# Patient Record
Sex: FEMALE | Race: WHITE | NOT HISPANIC OR LATINO | Employment: UNEMPLOYED | URBAN - METROPOLITAN AREA
[De-identification: names, ages, dates, MRNs, and addresses within clinical notes are randomized per-mention and may not be internally consistent; named-entity substitution may affect disease eponyms.]

---

## 2023-03-29 DIAGNOSIS — Z12.11 COLON CANCER SCREENING: ICD-10-CM

## 2023-05-03 DIAGNOSIS — E55.9 VITAMIN D DEFICIENCY: ICD-10-CM

## 2023-05-03 DIAGNOSIS — E78.5 DYSLIPIDEMIA: Primary | ICD-10-CM

## 2023-05-19 ENCOUNTER — LAB (OUTPATIENT)
Dept: LAB | Facility: LAB | Age: 78
End: 2023-05-19
Payer: MEDICARE

## 2023-05-19 DIAGNOSIS — E55.9 VITAMIN D DEFICIENCY: ICD-10-CM

## 2023-05-19 DIAGNOSIS — E78.5 DYSLIPIDEMIA: ICD-10-CM

## 2023-05-19 LAB
BASOPHILS (10*3/UL) IN BLOOD BY AUTOMATED COUNT: 0.03 X10E9/L (ref 0–0.1)
BASOPHILS/100 LEUKOCYTES IN BLOOD BY AUTOMATED COUNT: 0.6 % (ref 0–2)
CHOLESTEROL (MG/DL) IN SER/PLAS: 255 MG/DL (ref 0–199)
CHOLESTEROL IN HDL (MG/DL) IN SER/PLAS: 44.1 MG/DL
CHOLESTEROL/HDL RATIO: 5.8
EOSINOPHILS (10*3/UL) IN BLOOD BY AUTOMATED COUNT: 0.15 X10E9/L (ref 0–0.4)
EOSINOPHILS/100 LEUKOCYTES IN BLOOD BY AUTOMATED COUNT: 2.8 % (ref 0–6)
ERYTHROCYTE DISTRIBUTION WIDTH (RATIO) BY AUTOMATED COUNT: 13.5 % (ref 11.5–14.5)
ERYTHROCYTE MEAN CORPUSCULAR HEMOGLOBIN CONCENTRATION (G/DL) BY AUTOMATED: 31.4 G/DL (ref 32–36)
ERYTHROCYTE MEAN CORPUSCULAR VOLUME (FL) BY AUTOMATED COUNT: 96 FL (ref 80–100)
ERYTHROCYTES (10*6/UL) IN BLOOD BY AUTOMATED COUNT: 4.73 X10E12/L (ref 4–5.2)
HEMATOCRIT (%) IN BLOOD BY AUTOMATED COUNT: 45.5 % (ref 36–46)
HEMOGLOBIN (G/DL) IN BLOOD: 14.3 G/DL (ref 12–16)
IMMATURE GRANULOCYTES/100 LEUKOCYTES IN BLOOD BY AUTOMATED COUNT: 0.2 % (ref 0–0.9)
LDL: 169 MG/DL (ref 0–99)
LEUKOCYTES (10*3/UL) IN BLOOD BY AUTOMATED COUNT: 5.4 X10E9/L (ref 4.4–11.3)
LYMPHOCYTES (10*3/UL) IN BLOOD BY AUTOMATED COUNT: 1.46 X10E9/L (ref 0.8–3)
LYMPHOCYTES/100 LEUKOCYTES IN BLOOD BY AUTOMATED COUNT: 27.2 % (ref 13–44)
MONOCYTES (10*3/UL) IN BLOOD BY AUTOMATED COUNT: 0.41 X10E9/L (ref 0.05–0.8)
MONOCYTES/100 LEUKOCYTES IN BLOOD BY AUTOMATED COUNT: 7.6 % (ref 2–10)
NEUTROPHILS (10*3/UL) IN BLOOD BY AUTOMATED COUNT: 3.31 X10E9/L (ref 1.6–5.5)
NEUTROPHILS/100 LEUKOCYTES IN BLOOD BY AUTOMATED COUNT: 61.6 % (ref 40–80)
NON HDL CHOLESTEROL: 211 MG/DL
PLATELETS (10*3/UL) IN BLOOD AUTOMATED COUNT: 274 X10E9/L (ref 150–450)
THYROTROPIN (MIU/L) IN SER/PLAS BY DETECTION LIMIT <= 0.05 MIU/L: 2.37 MIU/L (ref 0.44–3.98)
TRIGLYCERIDE (MG/DL) IN SER/PLAS: 210 MG/DL (ref 0–149)
VLDL: 42 MG/DL (ref 0–40)

## 2023-05-19 PROCEDURE — 80061 LIPID PANEL: CPT

## 2023-05-19 PROCEDURE — 85025 COMPLETE CBC W/AUTO DIFF WBC: CPT

## 2023-05-19 PROCEDURE — 84443 ASSAY THYROID STIM HORMONE: CPT

## 2023-05-19 PROCEDURE — 82306 VITAMIN D 25 HYDROXY: CPT

## 2023-05-19 PROCEDURE — 36415 COLL VENOUS BLD VENIPUNCTURE: CPT

## 2023-05-20 LAB — CALCIDIOL (25 OH VITAMIN D3) (NG/ML) IN SER/PLAS: 27 NG/ML

## 2023-05-24 ENCOUNTER — OFFICE VISIT (OUTPATIENT)
Dept: PRIMARY CARE | Facility: CLINIC | Age: 78
End: 2023-05-24
Payer: MEDICARE

## 2023-05-24 VITALS
DIASTOLIC BLOOD PRESSURE: 76 MMHG | HEIGHT: 60 IN | HEART RATE: 89 BPM | WEIGHT: 138 LBS | SYSTOLIC BLOOD PRESSURE: 130 MMHG | BODY MASS INDEX: 27.09 KG/M2

## 2023-05-24 DIAGNOSIS — Z12.31 VISIT FOR SCREENING MAMMOGRAM: Primary | ICD-10-CM

## 2023-05-24 DIAGNOSIS — L30.9 ECZEMA, UNSPECIFIED TYPE: ICD-10-CM

## 2023-05-24 DIAGNOSIS — Z00.00 ROUTINE GENERAL MEDICAL EXAMINATION AT HEALTH CARE FACILITY: ICD-10-CM

## 2023-05-24 DIAGNOSIS — C22.1 INTRAHEPATIC BILE DUCT CARCINOMA (MULTI): ICD-10-CM

## 2023-05-24 DIAGNOSIS — E78.5 DYSLIPIDEMIA, GOAL LDL BELOW 130: ICD-10-CM

## 2023-05-24 PROBLEM — J32.9 SINUSITIS: Status: ACTIVE | Noted: 2023-05-24

## 2023-05-24 PROBLEM — R26.2 DIFFICULTY IN WALKING, NOT ELSEWHERE CLASSIFIED: Status: ACTIVE | Noted: 2019-03-09

## 2023-05-24 PROBLEM — U07.1 COVID-19 VIRUS INFECTION: Status: ACTIVE | Noted: 2023-05-24

## 2023-05-24 PROBLEM — R63.4 UNEXPLAINED WEIGHT LOSS: Status: ACTIVE | Noted: 2023-05-24

## 2023-05-24 PROBLEM — E53.8 VITAMIN B 12 DEFICIENCY: Status: ACTIVE | Noted: 2023-05-24

## 2023-05-24 PROBLEM — C23 GALLBLADDER CANCER (MULTI): Status: ACTIVE | Noted: 2023-05-24

## 2023-05-24 PROBLEM — M25.579 TOE JOINT PAIN: Status: ACTIVE | Noted: 2023-05-24

## 2023-05-24 PROBLEM — K63.5 COLON POLYP: Status: ACTIVE | Noted: 2023-05-24

## 2023-05-24 PROBLEM — R18.8 INTRAABDOMINAL FLUID COLLECTION: Status: ACTIVE | Noted: 2023-05-24

## 2023-05-24 PROBLEM — Z90.89 ACQUIRED ABSENCE OF OTHER ORGANS: Status: ACTIVE | Noted: 2019-03-09

## 2023-05-24 PROBLEM — K82.9 GALLBLADDER DISEASE: Status: ACTIVE | Noted: 2023-05-24

## 2023-05-24 PROBLEM — J96.00 ACUTE RESPIRATORY FAILURE (MULTI): Status: ACTIVE | Noted: 2019-03-09

## 2023-05-24 PROBLEM — K43.2 INCISIONAL HERNIA: Status: ACTIVE | Noted: 2023-05-24

## 2023-05-24 PROBLEM — M19.90 UNSPECIFIED OSTEOARTHRITIS, UNSPECIFIED SITE: Status: ACTIVE | Noted: 2019-03-09

## 2023-05-24 PROBLEM — M19.079 PRIMARY LOCALIZED OSTEOARTHROSIS, ANKLE AND FOOT: Status: ACTIVE | Noted: 2023-05-24

## 2023-05-24 PROBLEM — M62.81 MUSCLE WEAKNESS (GENERALIZED): Status: ACTIVE | Noted: 2019-03-09

## 2023-05-24 PROBLEM — M25.559 ACUTE HIP PAIN: Status: ACTIVE | Noted: 2023-05-24

## 2023-05-24 PROBLEM — Z90.49 ACQUIRED ABSENCE OF OTHER SPECIFIED PARTS OF DIGESTIVE TRACT: Status: ACTIVE | Noted: 2019-03-13

## 2023-05-24 PROBLEM — C23: Status: ACTIVE | Noted: 2019-03-09

## 2023-05-24 PROBLEM — J96.00 ACUTE RESPIRATORY FAILURE (MULTI): Status: RESOLVED | Noted: 2019-03-09 | Resolved: 2023-05-24

## 2023-05-24 PROBLEM — T85.9XXA DISORDER OF BILE DUCT STENT: Status: ACTIVE | Noted: 2023-05-24

## 2023-05-24 PROBLEM — E56.9 VITAMIN DEFICIENCY, UNSPECIFIED: Status: ACTIVE | Noted: 2019-03-09

## 2023-05-24 PROBLEM — M54.16 LUMBAR RADICULOPATHY: Status: ACTIVE | Noted: 2023-05-24

## 2023-05-24 PROBLEM — K21.9 GASTRO-ESOPHAGEAL REFLUX DISEASE WITHOUT ESOPHAGITIS: Status: ACTIVE | Noted: 2019-03-09

## 2023-05-24 PROBLEM — D50.9 IRON DEFICIENCY ANEMIA: Status: ACTIVE | Noted: 2023-05-24

## 2023-05-24 PROBLEM — E04.2 MULTIPLE THYROID NODULES: Status: ACTIVE | Noted: 2023-05-24

## 2023-05-24 PROBLEM — C80.1 ADENOCARCINOMA (MULTI): Status: ACTIVE | Noted: 2023-05-24

## 2023-05-24 PROBLEM — R19.00 PELVIC MASS: Status: ACTIVE | Noted: 2023-05-24

## 2023-05-24 PROBLEM — M72.2 PLANTAR FASCIITIS: Status: ACTIVE | Noted: 2023-05-24

## 2023-05-24 PROBLEM — K76.89 OTHER SPECIFIED DISEASES OF LIVER: Status: ACTIVE | Noted: 2019-03-09

## 2023-05-24 PROBLEM — E55.9 VITAMIN D DEFICIENCY: Status: ACTIVE | Noted: 2019-03-09

## 2023-05-24 PROBLEM — D69.6 THROMBOCYTOPENIA, UNSPECIFIED (CMS-HCC): Status: RESOLVED | Noted: 2019-03-09 | Resolved: 2023-05-24

## 2023-05-24 PROBLEM — K90.9 INTESTINAL MALABSORPTION (HHS-HCC): Status: ACTIVE | Noted: 2023-05-24

## 2023-05-24 PROBLEM — R10.13 EPIGASTRIC PAIN: Status: ACTIVE | Noted: 2023-05-24

## 2023-05-24 PROBLEM — D69.6 THROMBOCYTOPENIA, UNSPECIFIED (CMS-HCC): Status: ACTIVE | Noted: 2019-03-09

## 2023-05-24 PROCEDURE — G0439 PPPS, SUBSEQ VISIT: HCPCS | Performed by: INTERNAL MEDICINE

## 2023-05-24 PROCEDURE — 99213 OFFICE O/P EST LOW 20 MIN: CPT | Performed by: INTERNAL MEDICINE

## 2023-05-24 PROCEDURE — 1159F MED LIST DOCD IN RCRD: CPT | Performed by: INTERNAL MEDICINE

## 2023-05-24 PROCEDURE — 1160F RVW MEDS BY RX/DR IN RCRD: CPT | Performed by: INTERNAL MEDICINE

## 2023-05-24 PROCEDURE — 1170F FXNL STATUS ASSESSED: CPT | Performed by: INTERNAL MEDICINE

## 2023-05-24 PROCEDURE — 1036F TOBACCO NON-USER: CPT | Performed by: INTERNAL MEDICINE

## 2023-05-24 RX ORDER — TRIAMCINOLONE ACETONIDE 1 MG/G
CREAM TOPICAL 2 TIMES DAILY
Qty: 30 G | Refills: 0 | Status: SHIPPED | OUTPATIENT
Start: 2023-05-24 | End: 2023-11-20

## 2023-05-24 RX ORDER — ACETAMINOPHEN 500 MG
TABLET ORAL
COMMUNITY

## 2023-05-24 ASSESSMENT — ACTIVITIES OF DAILY LIVING (ADL)
TAKING_MEDICATION: INDEPENDENT
MANAGING_FINANCES: INDEPENDENT
BATHING: INDEPENDENT
DRESSING: INDEPENDENT
DOING_HOUSEWORK: INDEPENDENT
GROCERY_SHOPPING: INDEPENDENT

## 2023-05-24 NOTE — PROGRESS NOTES
Subjective   Patient ID: Taylor Venegas is a 78 y.o. female who presents for Medicare Annual Wellness Visit Subsequent.    HPI     Review of Systems    Objective   BP (!) 157/94   Pulse 89   Ht 1.524 m (5')   Wt 62.6 kg (138 lb)   BMI 26.95 kg/m²     Physical Exam    Assessment/Plan

## 2023-05-24 NOTE — PROGRESS NOTES
Subjective   Reason for Visit: Taylor Venegas is an 78 y.o. female here for a Medicare Wellness visit.          Reviewed all medications by prescribing practitioner or clinical pharmacist (such as prescriptions, OTCs, herbal therapies and supplements) and documented in the medical record.    She has been doing okay, hanging in there  Still working.  Goes to the [employer]    She walks regularly usually,   No cp or pressure  No sob  Bowels are regular, gets loose occasionally  Gets alittle reflux, no food sticking  No urinary issues    Mood is fine          Patient Care Team:  Cassy Gonzalez DO as PCP - General  Cassy Gonzalez DO as PCP - [employer] PCP     Review of Systems    Objective   Vitals:  BP (!) 157/94   Pulse 89   Ht 1.524 m (5')   Wt 62.6 kg (138 lb)   BMI 26.95 kg/m²       Physical Exam  Constitutional:       Appearance: Normal appearance.   Neck:      Vascular: No carotid bruit.   Cardiovascular:      Rate and Rhythm: Normal rate and regular rhythm.      Heart sounds: No murmur heard.  Pulmonary:      Effort: Pulmonary effort is normal.      Breath sounds: Normal breath sounds. No wheezing or rhonchi.   Abdominal:      General: Abdomen is flat.      Palpations: Abdomen is soft. There is no mass.      Tenderness: There is no abdominal tenderness.   Musculoskeletal:      Right lower leg: No edema.      Left lower leg: No edema.   Lymphadenopathy:      Cervical: No cervical adenopathy.   Skin:     General: Skin is dry.      Coloration: Skin is not jaundiced.      Findings: Rash (left shin with scaly reddish brown rash, not warm, no drainage) present.   Neurological:      Mental Status: She is alert and oriented to person, place, and time.   Psychiatric:         Mood and Affect: Mood normal.         Assessment/Plan   Problem List Items Addressed This Visit    None

## 2023-05-24 NOTE — PATIENT INSTRUCTIONS
Bp is good    Cholesterol is bad, for low hdl get regular exercise (doesn't tolerate fish oil)  For bad cholesterol, add oatmeal or daily metamucil  Recheck ct cardiac score in 2026, if any plaque will add statin    Call [phone] to schedule mammogram  Colonoscopy is scheduled  Bone density due in 2031  Get Prevnar 20 at the pharmacy, second pneumonia shot    Increase vitamin d to 4000units daily    Leg rash is eczema, use triamcinolone cream 1-2 times a day (has had in the past/eczema, no likely from the dog scratch)    Recheck in 1 year

## 2023-07-03 ENCOUNTER — HOSPITAL ENCOUNTER (OUTPATIENT)
Dept: DATA CONVERSION | Facility: HOSPITAL | Age: 78
End: 2023-07-03
Attending: INTERNAL MEDICINE
Payer: MEDICARE

## 2023-07-03 DIAGNOSIS — Z92.21 PERSONAL HISTORY OF ANTINEOPLASTIC CHEMOTHERAPY: ICD-10-CM

## 2023-07-03 DIAGNOSIS — Z12.11 ENCOUNTER FOR SCREENING FOR MALIGNANT NEOPLASM OF COLON: ICD-10-CM

## 2023-07-03 DIAGNOSIS — Z90.49 ACQUIRED ABSENCE OF OTHER SPECIFIED PARTS OF DIGESTIVE TRACT: ICD-10-CM

## 2023-07-03 DIAGNOSIS — K64.4 RESIDUAL HEMORRHOIDAL SKIN TAGS: ICD-10-CM

## 2023-07-03 DIAGNOSIS — Z86.010 PERSONAL HISTORY OF COLONIC POLYPS: ICD-10-CM

## 2023-07-03 DIAGNOSIS — K63.5 POLYP OF COLON: ICD-10-CM

## 2023-07-03 DIAGNOSIS — Z85.09 PERSONAL HISTORY OF MALIGNANT NEOPLASM OF OTHER DIGESTIVE ORGANS: ICD-10-CM

## 2023-07-03 DIAGNOSIS — K64.8 OTHER HEMORRHOIDS: ICD-10-CM

## 2023-07-03 DIAGNOSIS — D12.0 BENIGN NEOPLASM OF CECUM: ICD-10-CM

## 2023-07-10 LAB
COMPLETE PATHOLOGY REPORT: NORMAL
CONVERTED CLINICAL DIAGNOSIS-HISTORY: NORMAL
CONVERTED FINAL DIAGNOSIS: NORMAL
CONVERTED FINAL REPORT PDF LINK TO COPY AND PASTE: NORMAL
CONVERTED GROSS DESCRIPTION: NORMAL

## 2023-08-14 ENCOUNTER — OFFICE VISIT (OUTPATIENT)
Dept: PRIMARY CARE | Facility: CLINIC | Age: 78
End: 2023-08-14
Payer: MEDICARE

## 2023-08-14 VITALS
DIASTOLIC BLOOD PRESSURE: 86 MMHG | WEIGHT: 138 LBS | BODY MASS INDEX: 27.09 KG/M2 | SYSTOLIC BLOOD PRESSURE: 165 MMHG | HEIGHT: 60 IN | HEART RATE: 86 BPM

## 2023-08-14 DIAGNOSIS — J30.9 ALLERGIC RHINITIS, UNSPECIFIED SEASONALITY, UNSPECIFIED TRIGGER: Primary | ICD-10-CM

## 2023-08-14 DIAGNOSIS — S16.1XXA NECK STRAIN, INITIAL ENCOUNTER: ICD-10-CM

## 2023-08-14 DIAGNOSIS — H81.12 BENIGN PAROXYSMAL POSITIONAL VERTIGO OF LEFT EAR: ICD-10-CM

## 2023-08-14 PROCEDURE — 1160F RVW MEDS BY RX/DR IN RCRD: CPT | Performed by: INTERNAL MEDICINE

## 2023-08-14 PROCEDURE — 1036F TOBACCO NON-USER: CPT | Performed by: INTERNAL MEDICINE

## 2023-08-14 PROCEDURE — 1159F MED LIST DOCD IN RCRD: CPT | Performed by: INTERNAL MEDICINE

## 2023-08-14 PROCEDURE — 99213 OFFICE O/P EST LOW 20 MIN: CPT | Performed by: INTERNAL MEDICINE

## 2023-08-14 PROCEDURE — 1126F AMNT PAIN NOTED NONE PRSNT: CPT | Performed by: INTERNAL MEDICINE

## 2023-08-14 NOTE — PROGRESS NOTES
Subjective   Patient ID: Taylor Venegas is a 78 y.o. female who presents for Sinusitis.    HPI     Review of Systems    Objective   There were no vitals taken for this visit.    Physical Exam    Assessment/Plan

## 2023-08-14 NOTE — PROGRESS NOTES
Subjective   Patient ID: Taylor Venegas is a 78 y.o. female who presents for Sinusitis.    For the last few weeks her neck is bothering her and it cracks when she moves it    When she lays back at night she gets dizzy    She has had a lot of drainage and the other night her right ear popped    No fevers  No cough  Feels nasally  Clear nasal drainage        Sinusitis         Review of Systems    Objective   /86   Pulse 86   Ht 1.524 m (5')   Wt 62.6 kg (138 lb)   BMI 26.95 kg/m²     Physical Exam  Constitutional:       Appearance: Normal appearance.   HENT:      Ears:      Comments: Left tm bulges slightly  No redness or fluid b/l     Nose: Congestion present.      Comments: Turbs are pale and boggy, no drainage, septum deviated to the left  Neck:      Comments: Neck paraspinal muscles ropy and tender  Cardiovascular:      Rate and Rhythm: Normal rate and regular rhythm.      Heart sounds: No murmur heard.  Neurological:      Mental Status: She is alert.      Comments: No pronator drift  Finger to nose intact  Myers pike positive to the left with nystagmus         Assessment/Plan          Patient Instructions   Get the PREVNAR 20 at the pharmacy, any time    Dizziness is from positional vertigo of the left ear, do positional maneuver /Epley once and slowly. If still dizzy can repeat 1 week later, if still not better to vestibular therapy    Nasal congestion is allergies, no infection. Either use OTC flonase or nasonex to decongest OR can take zyrtec/cetirizine 10mg daily

## 2023-08-14 NOTE — PATIENT INSTRUCTIONS
Get the PREVNAR 20 at the pharmacy, any time    Dizziness is from positional vertigo of the left ear, do positional maneuver /Epley once and slowly. If still dizzy can repeat 1 week later, if still not better to vestibular therapy    Nasal congestion is allergies, no infection. Either use OTC flonase or nasonex to decongest OR can take zyrtec/cetirizine 10mg daily

## 2023-09-15 ENCOUNTER — HOSPITAL ENCOUNTER (OUTPATIENT)
Dept: DATA CONVERSION | Facility: HOSPITAL | Age: 78
End: 2023-09-15
Attending: RADIOLOGY
Payer: MEDICARE

## 2023-09-15 DIAGNOSIS — Z78.9 OTHER SPECIFIED HEALTH STATUS: ICD-10-CM

## 2023-09-15 DIAGNOSIS — C23 MALIGNANT NEOPLASM OF GALLBLADDER (MULTI): ICD-10-CM

## 2023-11-20 ENCOUNTER — OFFICE VISIT (OUTPATIENT)
Dept: PRIMARY CARE | Facility: CLINIC | Age: 78
End: 2023-11-20
Payer: MEDICARE

## 2023-11-20 VITALS
SYSTOLIC BLOOD PRESSURE: 153 MMHG | WEIGHT: 138 LBS | HEART RATE: 88 BPM | TEMPERATURE: 98.4 F | BODY MASS INDEX: 27.09 KG/M2 | HEIGHT: 60 IN | DIASTOLIC BLOOD PRESSURE: 86 MMHG | OXYGEN SATURATION: 98 %

## 2023-11-20 DIAGNOSIS — J06.9 UPPER RESPIRATORY TRACT INFECTION, UNSPECIFIED TYPE: Primary | ICD-10-CM

## 2023-11-20 DIAGNOSIS — J01.00 ACUTE NON-RECURRENT MAXILLARY SINUSITIS: ICD-10-CM

## 2023-11-20 DIAGNOSIS — H10.32 ACUTE CONJUNCTIVITIS OF LEFT EYE, UNSPECIFIED ACUTE CONJUNCTIVITIS TYPE: ICD-10-CM

## 2023-11-20 PROCEDURE — 1160F RVW MEDS BY RX/DR IN RCRD: CPT | Performed by: INTERNAL MEDICINE

## 2023-11-20 PROCEDURE — 1126F AMNT PAIN NOTED NONE PRSNT: CPT | Performed by: INTERNAL MEDICINE

## 2023-11-20 PROCEDURE — 99214 OFFICE O/P EST MOD 30 MIN: CPT | Performed by: INTERNAL MEDICINE

## 2023-11-20 PROCEDURE — 87637 SARSCOV2&INF A&B&RSV AMP PRB: CPT

## 2023-11-20 PROCEDURE — 1159F MED LIST DOCD IN RCRD: CPT | Performed by: INTERNAL MEDICINE

## 2023-11-20 PROCEDURE — 1036F TOBACCO NON-USER: CPT | Performed by: INTERNAL MEDICINE

## 2023-11-20 RX ORDER — CEFUROXIME AXETIL 250 MG/1
250 TABLET ORAL 2 TIMES DAILY
Qty: 20 TABLET | Refills: 0 | Status: SHIPPED | OUTPATIENT
Start: 2023-11-20 | End: 2023-11-30

## 2023-11-20 RX ORDER — TOBRAMYCIN AND DEXAMETHASONE 3; 1 MG/ML; MG/ML
1 SUSPENSION/ DROPS OPHTHALMIC
Qty: 5 ML | Refills: 0 | Status: SHIPPED | OUTPATIENT
Start: 2023-11-20 | End: 2023-11-30

## 2023-11-20 ASSESSMENT — ENCOUNTER SYMPTOMS: COUGH: 1

## 2023-11-20 NOTE — PATIENT INSTRUCTIONS
Upper respiratory infection with left sinusitis and pink eye  Checking for flu/covid/rsv  Take cefuroxime 250mg twice daily (covers sinus infection and lung infections)  Call if diarrhea or rash  For pink eye, use tobradex 1 drop every 4 hours while awake for 5 days, both eyes are red, use in both eyes     Call if not better

## 2023-11-20 NOTE — PROGRESS NOTES
Subjective   Patient ID: Taylor Venegas is a 78 y.o. female who presents for Cough and Sinusitis.    Started to get sick,   Sinus congestion and cough, now more in her chest and her coughing.   No fever, not sob  Cough is productive but doesn't look at it  She took mucinex, but gave her diarrhea.   Taking advil cold and sinus  Nasal drainage is clear  Left eye is irritated and draining clear fluid  No body aches.     Cough    Sinusitis  Associated symptoms include coughing.        Review of Systems   Respiratory:  Positive for cough.        Objective   /86   Pulse 87   Ht 1.524 m (5')   Wt 62.6 kg (138 lb)   BMI 26.95 kg/m²     Physical Exam  Constitutional:       Appearance: Normal appearance.   HENT:      Ears:      Comments: Left tm bulge slightly     Nose: Congestion present.      Comments: Yellow drainage on the left     Mouth/Throat:      Pharynx: Posterior oropharyngeal erythema present. No oropharyngeal exudate.   Eyes:      Comments: Hyperemia of b/l conjunctiva, worse on the left  No purulence but runny large amount of clear drainage   Cardiovascular:      Rate and Rhythm: Normal rate and regular rhythm.      Heart sounds: No murmur heard.  Pulmonary:      Effort: Pulmonary effort is normal.      Breath sounds: Normal breath sounds.   Neurological:      Mental Status: She is alert and oriented to person, place, and time.         Assessment/Plan          Patient Instructions   Upper respiratory infection with left sinusitis and pink eye  Checking for flu/covid/rsv  Take cefuroxime 250mg twice daily (covers sinus infection and lung infections)  Call if diarrhea or rash  For pink eye, use tobradex 1 drop every 4 hours while awake for 5 days, both eyes are red, use in both eyes     Call if not better

## 2023-11-20 NOTE — PROGRESS NOTES
Subjective   Patient ID: Taylor Venegas is a 78 y.o. female who presents for Cough and Sinusitis.    HPI     Review of Systems    Objective   There were no vitals taken for this visit.    Physical Exam    Assessment/Plan

## 2023-11-21 LAB
FLUAV RNA RESP QL NAA+PROBE: NOT DETECTED
FLUBV RNA RESP QL NAA+PROBE: NOT DETECTED
RSV RNA RESP QL NAA+PROBE: NOT DETECTED
SARS-COV-2 RNA RESP QL NAA+PROBE: NOT DETECTED

## 2024-08-22 ENCOUNTER — TELEPHONE (OUTPATIENT)
Dept: HEMATOLOGY/ONCOLOGY | Facility: CLINIC | Age: 79
End: 2024-08-22
Payer: MEDICARE

## 2024-08-23 DIAGNOSIS — Z08 ENCOUNTER FOR FOLLOW-UP SURVEILLANCE OF GALLBLADDER CANCER: ICD-10-CM

## 2024-08-23 DIAGNOSIS — Z85.09 ENCOUNTER FOR FOLLOW-UP SURVEILLANCE OF GALLBLADDER CANCER: ICD-10-CM

## 2024-08-23 DIAGNOSIS — C23 MALIGNANT NEOPLASM OF GALLBLADDER (MULTI): Primary | ICD-10-CM

## 2024-08-27 ENCOUNTER — TELEPHONE (OUTPATIENT)
Dept: HEMATOLOGY/ONCOLOGY | Facility: CLINIC | Age: 79
End: 2024-08-27
Payer: MEDICARE

## 2024-08-27 DIAGNOSIS — C80.1 ADENOCARCINOMA (MULTI): Primary | ICD-10-CM

## 2024-09-06 ENCOUNTER — TELEPHONE (OUTPATIENT)
Dept: HEMATOLOGY/ONCOLOGY | Facility: CLINIC | Age: 79
End: 2024-09-06
Payer: MEDICARE

## 2024-09-06 NOTE — TELEPHONE ENCOUNTER
Noted pt had an appt for her midline, but no longer had a CT scan ordered.  Note said pt cancelled appt.  Called pt and she states she was told she had to cancel because she did not have a midline appt made.  Appt rescheduled for CT so pt is appropriately scheduled for midline prior to CT scan.  Called scanning department and they said they will probably take pt right after midline is placed instead of making her wait until 1200.  Returned call to pt and explained she does have both appts scheduled, however, she may need to wait a bit, though they usually try to get people done soon.  Pt taught back understanding of appts and potential to wait for CT scan. Pt taught back appt details.  Told to call for any needs or to call scanning if she has any concerns with those.  Pt verbalized understanding and appreciation.

## 2024-09-16 ENCOUNTER — LAB (OUTPATIENT)
Dept: LAB | Facility: LAB | Age: 79
End: 2024-09-16
Payer: MEDICARE

## 2024-09-16 DIAGNOSIS — C23 MALIGNANT NEOPLASM OF GALLBLADDER (MULTI): ICD-10-CM

## 2024-09-16 DIAGNOSIS — Z08 ENCOUNTER FOR FOLLOW-UP SURVEILLANCE OF GALLBLADDER CANCER: ICD-10-CM

## 2024-09-16 DIAGNOSIS — Z85.09 ENCOUNTER FOR FOLLOW-UP SURVEILLANCE OF GALLBLADDER CANCER: ICD-10-CM

## 2024-09-16 LAB
ALBUMIN SERPL BCP-MCNC: 4 G/DL (ref 3.4–5)
ALP SERPL-CCNC: 73 U/L (ref 33–136)
ALT SERPL W P-5'-P-CCNC: 15 U/L (ref 7–45)
ANION GAP SERPL CALC-SCNC: 12 MMOL/L (ref 10–20)
AST SERPL W P-5'-P-CCNC: 20 U/L (ref 9–39)
BASOPHILS # BLD AUTO: 0.06 X10*3/UL (ref 0–0.1)
BASOPHILS NFR BLD AUTO: 1.3 %
BILIRUB SERPL-MCNC: 0.5 MG/DL (ref 0–1.2)
BUN SERPL-MCNC: 20 MG/DL (ref 6–23)
CALCIUM SERPL-MCNC: 8.8 MG/DL (ref 8.6–10.3)
CANCER AG19-9 SERPL-ACNC: 25.67 U/ML
CEA SERPL-MCNC: 0.7 UG/L
CHLORIDE SERPL-SCNC: 105 MMOL/L (ref 98–107)
CO2 SERPL-SCNC: 27 MMOL/L (ref 21–32)
CREAT SERPL-MCNC: 0.77 MG/DL (ref 0.5–1.05)
EGFRCR SERPLBLD CKD-EPI 2021: 79 ML/MIN/1.73M*2
EOSINOPHIL # BLD AUTO: 0.18 X10*3/UL (ref 0–0.4)
EOSINOPHIL NFR BLD AUTO: 4 %
ERYTHROCYTE [DISTWIDTH] IN BLOOD BY AUTOMATED COUNT: 13.4 % (ref 11.5–14.5)
GLUCOSE SERPL-MCNC: 71 MG/DL (ref 74–99)
HCT VFR BLD AUTO: 45.5 % (ref 36–46)
HGB BLD-MCNC: 14.3 G/DL (ref 12–16)
IMM GRANULOCYTES # BLD AUTO: 0.01 X10*3/UL (ref 0–0.5)
IMM GRANULOCYTES NFR BLD AUTO: 0.2 % (ref 0–0.9)
LYMPHOCYTES # BLD AUTO: 1.48 X10*3/UL (ref 0.8–3)
LYMPHOCYTES NFR BLD AUTO: 33 %
MCH RBC QN AUTO: 29.8 PG (ref 26–34)
MCHC RBC AUTO-ENTMCNC: 31.4 G/DL (ref 32–36)
MCV RBC AUTO: 95 FL (ref 80–100)
MONOCYTES # BLD AUTO: 0.31 X10*3/UL (ref 0.05–0.8)
MONOCYTES NFR BLD AUTO: 6.9 %
NEUTROPHILS # BLD AUTO: 2.44 X10*3/UL (ref 1.6–5.5)
NEUTROPHILS NFR BLD AUTO: 54.6 %
NRBC BLD-RTO: 0 /100 WBCS (ref 0–0)
PLATELET # BLD AUTO: 278 X10*3/UL (ref 150–450)
POTASSIUM SERPL-SCNC: 4.1 MMOL/L (ref 3.5–5.3)
PROT SERPL-MCNC: 6.8 G/DL (ref 6.4–8.2)
RBC # BLD AUTO: 4.8 X10*6/UL (ref 4–5.2)
SODIUM SERPL-SCNC: 140 MMOL/L (ref 136–145)
WBC # BLD AUTO: 4.5 X10*3/UL (ref 4.4–11.3)

## 2024-09-16 PROCEDURE — 80053 COMPREHEN METABOLIC PANEL: CPT

## 2024-09-16 PROCEDURE — 36415 COLL VENOUS BLD VENIPUNCTURE: CPT

## 2024-09-16 PROCEDURE — 86301 IMMUNOASSAY TUMOR CA 19-9: CPT

## 2024-09-16 PROCEDURE — 82378 CARCINOEMBRYONIC ANTIGEN: CPT

## 2024-09-16 PROCEDURE — 85025 COMPLETE CBC W/AUTO DIFF WBC: CPT

## 2024-09-19 ENCOUNTER — HOSPITAL ENCOUNTER (OUTPATIENT)
Dept: RADIOLOGY | Facility: HOSPITAL | Age: 79
Discharge: HOME | End: 2024-09-19
Payer: MEDICARE

## 2024-09-19 ENCOUNTER — APPOINTMENT (OUTPATIENT)
Dept: RADIOLOGY | Facility: HOSPITAL | Age: 79
End: 2024-09-19
Payer: MEDICARE

## 2024-09-19 DIAGNOSIS — C23 MALIGNANT NEOPLASM OF GALLBLADDER (MULTI): ICD-10-CM

## 2024-09-19 DIAGNOSIS — Z08 ENCOUNTER FOR FOLLOW-UP SURVEILLANCE OF GALLBLADDER CANCER: ICD-10-CM

## 2024-09-19 DIAGNOSIS — Z85.09 ENCOUNTER FOR FOLLOW-UP SURVEILLANCE OF GALLBLADDER CANCER: ICD-10-CM

## 2024-09-19 PROCEDURE — 2780000003 HC OR 278 NO HCPCS

## 2024-09-19 PROCEDURE — 76937 US GUIDE VASCULAR ACCESS: CPT

## 2024-09-19 PROCEDURE — C1751 CATH, INF, PER/CENT/MIDLINE: HCPCS

## 2024-09-19 PROCEDURE — 74177 CT ABD & PELVIS W/CONTRAST: CPT

## 2024-09-19 PROCEDURE — 2550000001 HC RX 255 CONTRASTS: Performed by: PHYSICIAN ASSISTANT

## 2024-09-26 ENCOUNTER — OFFICE VISIT (OUTPATIENT)
Dept: HEMATOLOGY/ONCOLOGY | Facility: CLINIC | Age: 79
End: 2024-09-26
Payer: MEDICARE

## 2024-09-26 VITALS
RESPIRATION RATE: 16 BRPM | DIASTOLIC BLOOD PRESSURE: 87 MMHG | TEMPERATURE: 96.4 F | BODY MASS INDEX: 26.97 KG/M2 | SYSTOLIC BLOOD PRESSURE: 145 MMHG | HEART RATE: 71 BPM | WEIGHT: 137.35 LBS | HEIGHT: 60 IN | OXYGEN SATURATION: 96 %

## 2024-09-26 DIAGNOSIS — C23 MALIGNANT NEOPLASM OF GALLBLADDER (MULTI): Primary | ICD-10-CM

## 2024-09-26 DIAGNOSIS — Z92.89 HISTORY OF MAMMOGRAPHY, SCREENING: ICD-10-CM

## 2024-09-26 PROCEDURE — 1126F AMNT PAIN NOTED NONE PRSNT: CPT | Performed by: PHYSICIAN ASSISTANT

## 2024-09-26 PROCEDURE — 99214 OFFICE O/P EST MOD 30 MIN: CPT | Performed by: PHYSICIAN ASSISTANT

## 2024-09-26 PROCEDURE — 1159F MED LIST DOCD IN RCRD: CPT | Performed by: PHYSICIAN ASSISTANT

## 2024-09-26 ASSESSMENT — PAIN SCALES - GENERAL: PAINLEVEL: 0-NO PAIN

## 2024-09-26 NOTE — PROGRESS NOTES
Visit Type: Follow Up Visit      Cancer History:   Treatment Synopsis:    patient is a pleasant 73-year-old  female referred by Dr. Cruz Farrell for evaluation and treatment for newly diagnosed gallbladder cancer     pt c/o burning of stomach for the last 4-5 months. failed outpt PPI. EGD in 9/2018 found to small peptic ulcer. s/p unremarkable colonoscopy later on. CT of abd/pelvis on 10/18/18  showed Gallbladder is irregular shaped with wall thickening and pericholecystic fluid and 2 periportal necrotic lymph nodes (3.1 x 1.9 cm and 1.7 x 1.6 cm). cystic lesion within the left adnexa measuring up to 6.7 cm.   patient underwent an upper EUS on 11/2/18 which showed no signs of significant pathology in the entire pancreas a few enlarged lymph nodes were visualized in the sanya hepatis region.  FNA of periportal lymph node showed malignant cells derived from mucin  producing adenocarcinoma consistent with cholangiocarcinoma.  patient underwent PET scan on 11/5/18 which showed irregular, hypermetabolic, nodular wall thickening of the gallbladder involving the body and extending to the neck. There is extension of hypermetabolic disease along the cystic duct and the proximal  common duct. There is enlarged, hypermetabolic portacaval lymph node, and additional smaller nodes in the region of the sanya hepatis, consistent with malignant adenopathy. There are multicystic left adnexal lesion does not demonstrate significant FDG  avidity.  patient was seen by Dr. Farrell and was refered her for evaluation for neoadjuvant chemotherapy  was started with cis and gemzar since 11/14/18. patient completed 3.5 cycles of cis and gemzar in 1/2019. s/p  radical cholecystectomy with liver wedge resection, regional lymphadenectomy, total hysterectomy and bilateral salpino-oopherectomy on 2/27/19.   Postop course complicated by E.coli intraabdominal abscess treated with percutaneous drainage and antibiotics. s/p bile leak and  recurrent intraabdominal abscess. s/p ERCP with stent placement in 4/2019. s/p drain removal. was resumed on adjuvant chemo  with cis and gemzar since 6/2019.  completed total 8 cycles of cis and gem in 8/2019.      she c/o intermittent RUQ abd discomfort and bulging of mid abd scar. she was seen by GI s/p biliary stent removal with resolution of biliary stricture per ERCP.     CT C/A/P in 9/2020 showed small hypodensity in segment 8 of the liver, stable. Infraumbilical ventral abdominal wall hernia containing bowel, without obstruction evident at  this time.  s/p hernia repair in 10/2020     PMH: none  PSH: two C section. benign breast surgery. bunionectomy  FH: mother had kidney failure. father had throat cancer. bbrother had cancer of tonsils. sister had uterine cancer. paternal uncles one had stomach cancer, the other 2 had colon cancer in their 70's.     History of Present Illness:   Patient presents for follow up accompanied by her daughter. On assessment, patient reports feeling wel overall. Appetite and weight are stable. Patient has no new complaints.  She  denies fever, chills, night sweats, unintentional weight loss, abd pain, NVD, constipation, bleeding, or any other complaints at this time.      Review of Systems:   Reviewed and discussed lab, imaging, and pathology results with patient in detail as well as diagnosis, prognosis, and treatment options.    Allergies and Intolerances:  No Known Allergies     Outpatient Medication Profile:  Current Outpatient Medications on File Prior to Visit   Medication Sig Dispense Refill    cholecalciferol (Vitamin D-3) 50 mcg (2,000 unit) capsule Take by mouth.       No current facility-administered medications on file prior to visit.     Vitals and Measurements:       3/15/2023     8:55 AM 5/24/2023     2:45 PM 5/24/2023     3:05 PM 8/14/2023     2:49 PM 11/20/2023    11:36 AM 11/20/2023    11:45 AM 9/26/2024    12:27 PM   Vitals   Systolic 130 157 130 165 153  145  "  Diastolic 83 94 76 86 86  87   Heart Rate 100 89  86 87 88 71   Temp 36.3 °C (97.3 °F)     36.9 °C (98.4 °F) 35.8 °C (96.4 °F)   Resp 16      16   Height (in) 1.549 m (5' 0.98\") 1.524 m (5')  1.524 m (5') 1.524 m (5')  1.512 m (4' 11.53\")    Weight (lb) 143.3 138  138 138  137.35   BMI 27.09 kg/m2 26.95 kg/m2  26.95 kg/m2 26.95 kg/m2  27.25 kg/m2   BSA (m2) 1.67 m2 1.63 m2  1.63 m2 1.63 m2  1.62 m2   Visit Report  Report Report Report Report Report Report       Significant value     Physical Exam:   Constitutional: alert, awake and oriented, not in acute distress   HEENT: moist mucous membranes, normal nose   Neck: supple, no lymphadenopathy   EYES: PERRL, EOM intact, conjunctiva normal  Skin: no jaundice, rash or erythema  Neurological: AAOx3, no gross focal deficit   Psychiatric: normal mood and behavior      Lab Results:  Labs:  Lab Results   Component Value Date    WBC 4.5 09/16/2024    NEUTROABS 2.44 09/16/2024    IGABSOL 0.01 09/16/2024    LYMPHSABS 1.48 09/16/2024    MONOSABS 0.31 09/16/2024    EOSABS 0.18 09/16/2024    BASOSABS 0.06 09/16/2024    RBC 4.80 09/16/2024    MCV 95 09/16/2024    MCHC 31.4 (L) 09/16/2024    HGB 14.3 09/16/2024    HCT 45.5 09/16/2024     09/16/2024     Lab Results   Component Value Date    CREATININE 0.77 09/16/2024    BUN 20 09/16/2024    EGFR 79 09/16/2024     09/16/2024    K 4.1 09/16/2024     09/16/2024    CO2 27 09/16/2024      Lab Results   Component Value Date    ALT 15 09/16/2024    AST 20 09/16/2024    ALKPHOS 73 09/16/2024    BILITOT 0.5 09/16/2024       Images:  CT chest abdomen pelvis w IV contrast    Result Date: 9/20/2024  Interpreted By:  Jorden Figueroa  and Shayy Burns STUDY: CT CHEST ABDOMEN PELVIS W IV CONTRAST;  9/19/2024 11:40 am   INDICATION: Signs/Symptoms: gallbladder cancer restaging  C23: Malignant neoplasm of gallbladder,C23 Malignant neoplasm of gallbladder (Multi).     COMPARISON: CT chest abdomen pelvis 09/15/2023.   ACCESSION " NUMBER(S): [id number]   ORDERING CLINICIAN: JADEN JIANG   TECHNIQUE: Contiguous axial images of the chest, abdomen, and pelvis were obtained after the intravenous administration of iodinated contrast. Coronal and sagittal reformatted images were reconstructed from the axial data.   FINDINGS: CT CHEST:   MEDIASTINUM AND LYMPH NODES:  Patulous appearance of the proximal to distal esophagus, correlate with concern for esophageal dysfunction. There is a stable small hiatal hernia.  No enlarged intrathoracic or axillary lymph nodes by imaging criteria. No pneumomediastinum.   VESSELS:  Normal caliber thoracic aorta. Mild aortic atherosclerosis.   HEART: Normal size.  No coronary artery calcifications. No significant pericardial effusion.   LUNG, AIRWAYS, PLEURA: The trachea and mainstem bronchus patent. There is no endobronchial lesion. Stable mild bilateral lower lobe bronchiolectasis. Similar appearing bandlike opacities within the left lower lung base, lingula and lateral aspect of the right middle lobe, likely represents atelectasis with a component of scarring. Superimposed ground-glass and reticular opacities, likely represents atelectasis. There is mild biapical pleural-parenchymal scarring. No consolidation, pulmonary edema, pleural effusion or pneumothorax.   CHEST WALL SOFT TISSUES: No discernible acute abnormality.   OSSEOUS STRUCTURES: No acute osseous abnormality.     CT ABDOMEN/PELVIS:   ABDOMINAL WALL: Within normal limits.   LIVER: Liver is normal in size and enhancement. There are 2 stable small liver simple cysts measuring up to 1.4 cm. No new suspicious liver lesions   BILE DUCTS: No significant intrahepatic or extrahepatic dilatation. Stable mild circumferential mural thickening and enhancement of the extrahepatic bile duct.   GALLBLADDER: Status post cholecystectomy.   PANCREAS: No significant abnormality.   SPLEEN: No significant abnormality.   ADRENALS: No significant abnormality.   KIDNEYS,  URETERS, BLADDER: Kidneys are normal in size and enhancement. Numerous bilateral parapelvic renal cysts are again noted. No hydronephrosis or nephrolithiasis. Stable mild mural thickening along the anterior aspect of the bladder wall,adherent to the anterior abdominal wall..   REPRODUCTIVE ORGANS: The uterus is surgically absent. No pelvic masses.   VESSELS: Mild atherosclerotic calcification of the abdominal aorta and its branching vessels without aneurysmal dilatation. The IVC is normal in caliber.   RETROPERITONEUM/LYMPH NODES: No enlarged lymph nodes. No acute retroperitoneal abnormality.   BOWEL/MESENTERY/PERITONEUM: The stomach is unremarkable for degree of distention. Fecalization of loops of bowel in the pelvis without distention, likely relating to slow transit.  no inflammatory bowel wall thickening or dilatation. Normal appendix.   No ascites, free air, or fluid collection.     MUSCULOSKELETAL: No acute osseous abnormality. Stable levoscoliosis of the lumbar spine. Multilevel degenerative changes are similar to prior. No suspicious osseous lesions       Gallbladder restaging scan.   1. Postsurgical changes of cholecystectomy without abnormal enhancing nodularity to suggest locoregional disease recurrence. No evidence of metastatic disease in the chest abdomen or pelvis. 2. Additional stable chronic findings as above.   I personally reviewed the images/study and I agree with the findings as stated by Resident Katy Lucas. This study was interpreted at [facility], [place], [place].   MACRO: None.   Signed by: Jorden Figueroa 9/20/2024 9:01 PM Dictation workstation:   [id number]    [facility] midline    Result Date: 9/19/2024  These images are not reportable by radiology and will not be interpreted by  Radiologists.      Assessment:    74-year-old  female presented with a locally advanced gallbladder cancer with at least two necrotic periprotal LNs, completed  neoadjuvant chemo  with cis and gemzar (3 cycles) since 11/2018 to 1/2019, followed by radical cholecystectomy with liver wedge resection, regional lymphadenectomy, total hysterectomy and bilateral salpino-oopherectomy on 2/27/19 (Stage IIIB pT3 pN1 M0). completed 4 additional  cycles of cis and gem in 8/2019. restaging CT did not show evidence of disease,  Postop course complicated by E.coli intraabdominal abscess treated with percutaneous drainage and antibiotics. s/p bile leak and recurrent intraabdominal abscess. s/p ERCP with stent placement in 4/2019. 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     mild anemia will send nutritional labs     f/U with endocrinology and PCP    Mammo ordered since doesn't have PCP and missed her yearly one in April     RTC in 12 months     Patient verbalized understanding, and all her questions were answered to her satisfaction     30 min spent with patient greater than 50 % of which was spent in consultation and coordination of care.

## 2024-10-01 ENCOUNTER — TELEPHONE (OUTPATIENT)
Dept: HEMATOLOGY/ONCOLOGY | Facility: CLINIC | Age: 79
End: 2024-10-01
Payer: MEDICARE

## 2024-10-01 DIAGNOSIS — Z92.89 HISTORY OF MAMMOGRAPHY, SCREENING: ICD-10-CM

## 2024-10-01 DIAGNOSIS — C23 MALIGNANT NEOPLASM OF GALLBLADDER (MULTI): ICD-10-CM

## 2024-10-02 NOTE — TELEPHONE ENCOUNTER
Per P. Bardi PAC,  pt to have screening mammogram. Checked with scanning and all mammograms are tomosynthesis.  Screening requisition entered as requested per P. Bardi PAC.  Scanning called and aware it is to be changed to this.

## 2024-10-03 NOTE — TELEPHONE ENCOUNTER
Called Radiology and confirmed that pt's appointment and requisition are ok for tomorrow's appointment.  They stated all is in order for tomorrow. (Per Josse)

## 2024-10-04 ENCOUNTER — APPOINTMENT (OUTPATIENT)
Dept: RADIOLOGY | Facility: HOSPITAL | Age: 79
End: 2024-10-04
Payer: MEDICARE

## 2024-10-04 ENCOUNTER — HOSPITAL ENCOUNTER (OUTPATIENT)
Dept: RADIOLOGY | Facility: HOSPITAL | Age: 79
Discharge: HOME | End: 2024-10-04
Payer: MEDICARE

## 2024-10-04 VITALS — WEIGHT: 137.35 LBS | BODY MASS INDEX: 27.69 KG/M2 | HEIGHT: 59 IN

## 2024-10-04 DIAGNOSIS — Z12.31 VISIT FOR SCREENING MAMMOGRAM: ICD-10-CM

## 2024-10-04 PROCEDURE — 77067 SCR MAMMO BI INCL CAD: CPT

## 2024-10-04 PROCEDURE — 77063 BREAST TOMOSYNTHESIS BI: CPT | Performed by: RADIOLOGY

## 2024-10-04 PROCEDURE — 77067 SCR MAMMO BI INCL CAD: CPT | Performed by: RADIOLOGY

## 2025-06-26 NOTE — PROGRESS NOTES
Subjective   Reason for Visit: Taylor Venegas is an 80 y.o. female here for annual exam.   .          HPI  Taylor is a 80 year old female with past medical history significant for allergic rhinitis, BPPV, gallbladder cancer s/p radical cholecystectomy with liver wedge resection, regional lymphadenectomy, total hysterectomy and bilateral salpino-oopherectomy on 2/27/19.  Actively follows hematology/oncology for annual CT scanning and labs. Last seen 9/26/24 ILENE Barnett CNP.  Has CT scheduled 9/2025. Last seen in primary care 2023 Dr. Gonzalez.     Feels good. Very busy. Owns and manages own business. Works every day, active going up and down stairs, lifting. Lives with daughter. Very active in family and grandchildren's sporting events. Denies complaints. Has energy, sleeping well, no change in appetite.       Denies chest pain, palpitations, shortness of breath or ankle swelling  Denies cough, wheeze or dyspnea with exertion  Denies fever, chills, lymphadenopathy or unexplained weight loss  Denies abdominal pain, change in bowel pattern, melena or hematochezia  Denies difficulty urinating, hematuria or vaginal bleeding. If drinks caffeine will have to urinate more often after.   Right lower back pain, below waistband, noted after sleeping wrong on right side, has improved with time and aleve. Denies numbness, tingling, saddle anesthesia, loss of muscle strength.   Denies rash, open sores or lesions  Denies tremors, seizures, dizziness or numbness/tingling      Hobbies: family, cooks, shops  Last eye exam: few months ago  Last dental Exam: few months ago, goes twice year      Colonoscopy:2023- adenomatous polyp, recommend repeat 5 year 2028  Mammogram: 10/4/24-negative, repeat 10/2025  Dexa: 2021-osteopenia    Vaccinations: recommend TDAP, RSV        :       Health Maintenance Due   Topic Date Due    Hepatitis C Screening  Never done    Hepatitis A Vaccines (1 of 2 - Risk 2-dose series) Never done    Hepatitis B  Vaccines (1 of 3 - Risk 3-dose series) Never done    RSV High Risk: (Elderly (60+) or Pregnant Population) (1 - 1-dose 75+ series) Never done    Medicare Annual Wellness Visit (AWV)  03/25/2023    COVID-19 Vaccine (5 - 2024-25 season) 09/01/2024       RX Allergies[1]            No visits with results within 60 Day(s) from this visit.   Latest known visit with results is:   Lab on 09/16/2024   Component Date Value Ref Range Status    Cancer AG 19-9 09/16/2024 25.67  <35.00 U/mL Final    Glucose 09/16/2024 71 (L)  74 - 99 mg/dL Final    Sodium 09/16/2024 140  136 - 145 mmol/L Final    Potassium 09/16/2024 4.1  3.5 - 5.3 mmol/L Final    Chloride 09/16/2024 105  98 - 107 mmol/L Final    Bicarbonate 09/16/2024 27  21 - 32 mmol/L Final    Anion Gap 09/16/2024 12  10 - 20 mmol/L Final    Urea Nitrogen 09/16/2024 20  6 - 23 mg/dL Final    Creatinine 09/16/2024 0.77  0.50 - 1.05 mg/dL Final    eGFR 09/16/2024 79  >60 mL/min/1.73m*2 Final    Calculations of estimated GFR are performed using the 2021 CKD-EPI Study Refit equation without the race variable for the IDMS-Traceable creatinine methods.  https://jasn.asnjournals.org/content/early/2021/09/22/ASN.5751139974    Calcium 09/16/2024 8.8  8.6 - 10.3 mg/dL Final    Albumin 09/16/2024 4.0  3.4 - 5.0 g/dL Final    Alkaline Phosphatase 09/16/2024 73  33 - 136 U/L Final    Total Protein 09/16/2024 6.8  6.4 - 8.2 g/dL Final    AST 09/16/2024 20  9 - 39 U/L Final    Bilirubin, Total 09/16/2024 0.5  0.0 - 1.2 mg/dL Final    ALT 09/16/2024 15  7 - 45 U/L Final    Patients treated with Sulfasalazine may generate falsely decreased results for ALT.    Carcinoembryonic AG 09/16/2024 0.7  ug/L Final    WBC 09/16/2024 4.5  4.4 - 11.3 x10*3/uL Final    nRBC 09/16/2024 0.0  0.0 - 0.0 /100 WBCs Final    RBC 09/16/2024 4.80  4.00 - 5.20 x10*6/uL Final    Hemoglobin 09/16/2024 14.3  12.0 - 16.0 g/dL Final    Hematocrit 09/16/2024 45.5  36.0 - 46.0 % Final    MCV 09/16/2024 95  80 - 100 fL  Final    MCH 09/16/2024 29.8  26.0 - 34.0 pg Final    MCHC 09/16/2024 31.4 (L)  32.0 - 36.0 g/dL Final    RDW 09/16/2024 13.4  11.5 - 14.5 % Final    Platelets 09/16/2024 278  150 - 450 x10*3/uL Final    Neutrophils % 09/16/2024 54.6  40.0 - 80.0 % Final    Immature Granulocytes %, Automated 09/16/2024 0.2  0.0 - 0.9 % Final    Immature Granulocyte Count (IG) includes promyelocytes, myelocytes and metamyelocytes but does not include bands. Percent differential counts (%) should be interpreted in the context of the absolute cell counts (cells/UL).    Lymphocytes % 09/16/2024 33.0  13.0 - 44.0 % Final    Monocytes % 09/16/2024 6.9  2.0 - 10.0 % Final    Eosinophils % 09/16/2024 4.0  0.0 - 6.0 % Final    Basophils % 09/16/2024 1.3  0.0 - 2.0 % Final    Neutrophils Absolute 09/16/2024 2.44  1.60 - 5.50 x10*3/uL Final    Percent differential counts (%) should be interpreted in the context of the absolute cell counts (cells/uL).    Immature Granulocytes Absolute, Au* 09/16/2024 0.01  0.00 - 0.50 x10*3/uL Final    Lymphocytes Absolute 09/16/2024 1.48  0.80 - 3.00 x10*3/uL Final    Monocytes Absolute 09/16/2024 0.31  0.05 - 0.80 x10*3/uL Final    Eosinophils Absolute 09/16/2024 0.18  0.00 - 0.40 x10*3/uL Final    Basophils Absolute 09/16/2024 0.06  0.00 - 0.10 x10*3/uL Final         Patient Care Team:  Cassy Gonzalez DO as PCP - General  Cassy Gonzalez DO as PCP - [employer] PCP     Review of Systems   Constitutional:  Negative for appetite change, chills and fever.   HENT:  Negative for trouble swallowing and voice change.    Respiratory:  Negative for cough, shortness of breath and wheezing.    Cardiovascular:  Negative for chest pain, palpitations and leg swelling.   Gastrointestinal: Negative.  Negative for abdominal pain, blood in stool, diarrhea, nausea and vomiting.   Genitourinary:  Negative for dysuria, hematuria and vaginal bleeding.   Musculoskeletal:  Positive for back pain.   Skin:   "Negative for rash and wound.   Neurological:  Negative for dizziness, tremors, seizures and numbness.   Psychiatric/Behavioral:  The patient is not nervous/anxious.        Objective   Vitals:  /86   Pulse 75   Temp 36.8 °C (98.2 °F)   Ht (!) 1.499 m (4' 11\")   Wt 62.3 kg (137 lb 6.4 oz)   SpO2 98%   BMI 27.75 kg/m²       Surgical History[2]    Current Outpatient Medications   Medication Instructions    cholecalciferol (Vitamin D-3) 50 mcg (2,000 unit) capsule Take by mouth.       Physical Exam  Constitutional:       Appearance: She is not ill-appearing or toxic-appearing.   Eyes:      Conjunctiva/sclera: Conjunctivae normal.   Cardiovascular:      Rate and Rhythm: Normal rate and regular rhythm.      Heart sounds: Normal heart sounds.   Pulmonary:      Effort: No respiratory distress.      Breath sounds: No wheezing, rhonchi or rales.   Abdominal:      General: Bowel sounds are normal.      Palpations: Abdomen is soft.      Tenderness: There is no abdominal tenderness.   Musculoskeletal:      Cervical back: Normal.      Thoracic back: Normal.      Lumbar back: Tenderness present. No deformity, signs of trauma, lacerations or spasms. Normal range of motion.      Right lower leg: No edema.      Left lower leg: No edema.      Comments: Gait normal  No bruising, deformity or asymmetry  ROM intact upon flexion-foreward                     Extension-back bend                     Lateral bend  Able to sit and stand independently without difficulty  Neurovascular intact/Sensation intact  Able to walk on heels  Able to walk on toes  Negative leg raise    Lymphadenopathy:      Cervical: No cervical adenopathy.   Skin:     General: Skin is warm.      Findings: No bruising, erythema, lesion or rash.   Neurological:      Mental Status: She is oriented to person, place, and time.         Assessment & Plan  Encounter for health maintenance examination in adult    Orders:    CBC; Future    Comprehensive Metabolic Panel; " Future    Hemoglobin A1C; Future    Lipid Panel; Future    TSH with reflex to Free T4 if abnormal; Future    Vitamin D 25-Hydroxy,Total (for eval of Vitamin D levels); Future    Well adult female exam-discussed diet/exercise, cardiac risk factors including wt mgmt, good control of BP, chol, screen for DM. Discussed CA screening, including breast, colon, skin. Observe for changing moles     Gallbladder cancer (Multi)    Follow up with hematology/oncology for annual surveillance    Orders:    CBC; Future    Comprehensive Metabolic Panel; Future    BMI 27.0-27.9,adult    Orders:    Hemoglobin A1C; Future    Lipid Panel; Future    TSH with reflex to Free T4 if abnormal; Future    Vitamin D deficiency    Orders:    Lipid Panel; Future    Vitamin D 25-Hydroxy,Total (for eval of Vitamin D levels); Future    Hyperlipidemia, unspecified hyperlipidemia type    Orders:    CBC; Future    Comprehensive Metabolic Panel; Future    Lipid Panel; Future    TSH with reflex to Free T4 if abnormal; Future    Vitamin D 25-Hydroxy,Total (for eval of Vitamin D levels); Future    Screening for diabetes mellitus    Orders:    Hemoglobin A1C; Future    Acute right-sided low back pain without sciatica    Orders:    XR lumbar spine 2-3 views; Future    Need for diphtheria-tetanus-pertussis (Tdap) vaccine    Orders:    Tdap vaccine greater than or equal to 7 years old IM    Osteopenia after menopause    Orders:    Vitamin D 25-Hydroxy,Total (for eval of Vitamin D levels); Future    XR DEXA bone density; Future    Encounter for screening mammogram for malignant neoplasm of breast    Orders:    BI mammo bilateral screening tomosynthesis; Future                  [1] No Known Allergies  [2]   Past Surgical History:  Procedure Laterality Date    BREAST BIOPSY  11/10/2016    Biopsy Breast Open    BUNIONECTOMY  11/10/2016    Simple Bunion Exostectomy ([facility])    CT GUIDED PERCUTANEOUS BIOPSY MEDIASTINUM  3/2/2019    CT GUIDED PERCUTANEOUS  BIOPSY MEDIASTINUM 3/2/2019 [facility] CLINICAL LEGACY    FOOT SURGERY  01/20/2014    Foot Surgery    OTHER SURGICAL HISTORY  03/12/2019    Cholecystectomy    OTHER SURGICAL HISTORY  03/12/2019    Hysterectomy total abdominal with removal of both ovaries    US GUIDED ABSCESS DRAIN  4/17/2019    US GUIDED ABSCESS DRAIN 4/17/2019 [facility] CLINICAL LEGACY    US GUIDED NEEDLE LIVER BIOPSY  4/17/2019    US GUIDED NEEDLE LIVER BIOPSY 4/17/2019 [facility] CLINICAL LEGACY    US GUIDED PERCUTANEOUS PERITONEAL OR RETROPERITONEAL FLUID COLLECTION DRAINAGE  4/17/2019    US GUIDED PERCUTANEOUS PERITONEAL OR RETROPERITONEAL FLUID COLLECTION DRAINAGE 4/17/2019 [facility] CLINICAL LEGACY

## 2025-06-27 ENCOUNTER — APPOINTMENT (OUTPATIENT)
Dept: PRIMARY CARE | Facility: CLINIC | Age: 80
End: 2025-06-27
Payer: MEDICARE

## 2025-06-27 VITALS
HEART RATE: 75 BPM | WEIGHT: 137.4 LBS | BODY MASS INDEX: 27.7 KG/M2 | DIASTOLIC BLOOD PRESSURE: 86 MMHG | OXYGEN SATURATION: 98 % | TEMPERATURE: 98.2 F | HEIGHT: 59 IN | SYSTOLIC BLOOD PRESSURE: 132 MMHG

## 2025-06-27 DIAGNOSIS — Z12.31 ENCOUNTER FOR SCREENING MAMMOGRAM FOR MALIGNANT NEOPLASM OF BREAST: ICD-10-CM

## 2025-06-27 DIAGNOSIS — Z00.00 ENCOUNTER FOR HEALTH MAINTENANCE EXAMINATION IN ADULT: Primary | ICD-10-CM

## 2025-06-27 DIAGNOSIS — Z13.1 SCREENING FOR DIABETES MELLITUS: ICD-10-CM

## 2025-06-27 DIAGNOSIS — M85.80 OSTEOPENIA AFTER MENOPAUSE: ICD-10-CM

## 2025-06-27 DIAGNOSIS — Z78.0 OSTEOPENIA AFTER MENOPAUSE: ICD-10-CM

## 2025-06-27 DIAGNOSIS — E55.9 VITAMIN D DEFICIENCY: ICD-10-CM

## 2025-06-27 DIAGNOSIS — Z23 NEED FOR DIPHTHERIA-TETANUS-PERTUSSIS (TDAP) VACCINE: ICD-10-CM

## 2025-06-27 DIAGNOSIS — M54.50 ACUTE RIGHT-SIDED LOW BACK PAIN WITHOUT SCIATICA: ICD-10-CM

## 2025-06-27 DIAGNOSIS — C23 GALLBLADDER CANCER (MULTI): ICD-10-CM

## 2025-06-27 DIAGNOSIS — E78.5 HYPERLIPIDEMIA, UNSPECIFIED HYPERLIPIDEMIA TYPE: ICD-10-CM

## 2025-06-27 PROCEDURE — G0439 PPPS, SUBSEQ VISIT: HCPCS | Performed by: NURSE PRACTITIONER

## 2025-06-27 PROCEDURE — 90715 TDAP VACCINE 7 YRS/> IM: CPT | Performed by: NURSE PRACTITIONER

## 2025-06-27 PROCEDURE — 90471 IMMUNIZATION ADMIN: CPT | Performed by: NURSE PRACTITIONER

## 2025-06-27 PROCEDURE — 1036F TOBACCO NON-USER: CPT | Performed by: NURSE PRACTITIONER

## 2025-06-27 PROCEDURE — 1159F MED LIST DOCD IN RCRD: CPT | Performed by: NURSE PRACTITIONER

## 2025-06-27 PROCEDURE — 1160F RVW MEDS BY RX/DR IN RCRD: CPT | Performed by: NURSE PRACTITIONER

## 2025-06-27 ASSESSMENT — ENCOUNTER SYMPTOMS
SHORTNESS OF BREATH: 0
DIARRHEA: 0
FEVER: 0
TROUBLE SWALLOWING: 0
PALPITATIONS: 0
BACK PAIN: 1
TREMORS: 0
WHEEZING: 0
SEIZURES: 0
GASTROINTESTINAL NEGATIVE: 1
VOICE CHANGE: 0
NAUSEA: 0
NUMBNESS: 0
WOUND: 0
DIZZINESS: 0
COUGH: 0
APPETITE CHANGE: 0
VOMITING: 0
CHILLS: 0
BLOOD IN STOOL: 0
ABDOMINAL PAIN: 0
HEMATURIA: 0
NERVOUS/ANXIOUS: 0
DYSURIA: 0

## 2025-06-27 ASSESSMENT — ANXIETY QUESTIONNAIRES
GAD7 TOTAL SCORE: 0
1. FEELING NERVOUS, ANXIOUS, OR ON EDGE: NOT AT ALL
4. TROUBLE RELAXING: NOT AT ALL
2. NOT BEING ABLE TO STOP OR CONTROL WORRYING: NOT AT ALL
7. FEELING AFRAID AS IF SOMETHING AWFUL MIGHT HAPPEN: NOT AT ALL
6. BECOMING EASILY ANNOYED OR IRRITABLE: NOT AT ALL
3. WORRYING TOO MUCH ABOUT DIFFERENT THINGS: NOT AT ALL
5. BEING SO RESTLESS THAT IT IS HARD TO SIT STILL: NOT AT ALL

## 2025-06-27 ASSESSMENT — PATIENT HEALTH QUESTIONNAIRE - PHQ9
1. LITTLE INTEREST OR PLEASURE IN DOING THINGS: NOT AT ALL
SUM OF ALL RESPONSES TO PHQ9 QUESTIONS 1 AND 2: 0
2. FEELING DOWN, DEPRESSED OR HOPELESS: NOT AT ALL

## 2025-06-27 NOTE — ASSESSMENT & PLAN NOTE
Orders:    Lipid Panel; Future    Vitamin D 25-Hydroxy,Total (for eval of Vitamin D levels); Future

## 2025-06-27 NOTE — ASSESSMENT & PLAN NOTE
Orders:    CBC; Future    Comprehensive Metabolic Panel; Future    Lipid Panel; Future    TSH with reflex to Free T4 if abnormal; Future    Vitamin D 25-Hydroxy,Total (for eval of Vitamin D levels); Future

## 2025-06-27 NOTE — ASSESSMENT & PLAN NOTE
Follow up with hematology/oncology for annual surveillance    Orders:    CBC; Future    Comprehensive Metabolic Panel; Future

## 2025-07-10 ENCOUNTER — APPOINTMENT (OUTPATIENT)
Dept: RADIOLOGY | Facility: HOSPITAL | Age: 80
End: 2025-07-10
Payer: MEDICARE

## 2025-07-10 ENCOUNTER — HOSPITAL ENCOUNTER (OUTPATIENT)
Dept: RADIOLOGY | Facility: HOSPITAL | Age: 80
Discharge: HOME | End: 2025-07-10
Payer: MEDICARE

## 2025-07-10 DIAGNOSIS — M85.80 OSTEOPENIA AFTER MENOPAUSE: ICD-10-CM

## 2025-07-10 DIAGNOSIS — Z78.0 OSTEOPENIA AFTER MENOPAUSE: ICD-10-CM

## 2025-07-10 DIAGNOSIS — M54.50 ACUTE RIGHT-SIDED LOW BACK PAIN WITHOUT SCIATICA: ICD-10-CM

## 2025-07-10 LAB
25(OH)D3+25(OH)D2 SERPL-MCNC: 35 NG/ML (ref 30–100)
ALBUMIN SERPL-MCNC: 4.1 G/DL (ref 3.6–5.1)
ALP SERPL-CCNC: 75 U/L (ref 37–153)
ALT SERPL-CCNC: 22 U/L (ref 6–29)
ANION GAP SERPL CALCULATED.4IONS-SCNC: 13 MMOL/L (CALC) (ref 7–17)
AST SERPL-CCNC: 26 U/L (ref 10–35)
BILIRUB SERPL-MCNC: 0.6 MG/DL (ref 0.2–1.2)
BUN SERPL-MCNC: 20 MG/DL (ref 7–25)
CALCIUM SERPL-MCNC: 9.4 MG/DL (ref 8.6–10.4)
CHLORIDE SERPL-SCNC: 103 MMOL/L (ref 98–110)
CHOLEST SERPL-MCNC: 256 MG/DL
CHOLEST/HDLC SERPL: 5.7 (CALC)
CO2 SERPL-SCNC: 23 MMOL/L (ref 20–32)
CREAT SERPL-MCNC: 0.73 MG/DL (ref 0.6–1)
EGFRCR SERPLBLD CKD-EPI 2021: 84 ML/MIN/1.73M2
ERYTHROCYTE [DISTWIDTH] IN BLOOD BY AUTOMATED COUNT: 14.2 % (ref 11–15)
EST. AVERAGE GLUCOSE BLD GHB EST-MCNC: 114 MG/DL
EST. AVERAGE GLUCOSE BLD GHB EST-SCNC: 6.3 MMOL/L
GLUCOSE SERPL-MCNC: 82 MG/DL (ref 65–99)
HBA1C MFR BLD: 5.6 %
HCT VFR BLD AUTO: 45.7 % (ref 35–45)
HDLC SERPL-MCNC: 45 MG/DL
HGB BLD-MCNC: 14.6 G/DL (ref 11.7–15.5)
LDLC SERPL CALC-MCNC: 164 MG/DL (CALC)
MCH RBC QN AUTO: 30.5 PG (ref 27–33)
MCHC RBC AUTO-ENTMCNC: 31.9 G/DL (ref 32–36)
MCV RBC AUTO: 95.4 FL (ref 80–100)
NONHDLC SERPL-MCNC: 211 MG/DL (CALC)
PLATELET # BLD AUTO: 279 THOUSAND/UL (ref 140–400)
PMV BLD REES-ECKER: 10.5 FL (ref 7.5–12.5)
POTASSIUM SERPL-SCNC: 4.2 MMOL/L (ref 3.5–5.3)
PROT SERPL-MCNC: 6.7 G/DL (ref 6.1–8.1)
RBC # BLD AUTO: 4.79 MILLION/UL (ref 3.8–5.1)
SODIUM SERPL-SCNC: 139 MMOL/L (ref 135–146)
TRIGL SERPL-MCNC: 284 MG/DL
TSH SERPL-ACNC: 1.83 MIU/L (ref 0.4–4.5)
WBC # BLD AUTO: 6.6 THOUSAND/UL (ref 3.8–10.8)

## 2025-07-10 PROCEDURE — 72110 X-RAY EXAM L-2 SPINE 4/>VWS: CPT

## 2025-07-10 PROCEDURE — 72100 X-RAY EXAM L-S SPINE 2/3 VWS: CPT

## 2025-07-10 PROCEDURE — 72110 X-RAY EXAM L-2 SPINE 4/>VWS: CPT | Performed by: RADIOLOGY

## 2025-07-10 PROCEDURE — 77080 DXA BONE DENSITY AXIAL: CPT

## 2025-07-10 PROCEDURE — 77080 DXA BONE DENSITY AXIAL: CPT | Performed by: STUDENT IN AN ORGANIZED HEALTH CARE EDUCATION/TRAINING PROGRAM

## 2025-07-11 NOTE — RESULT ENCOUNTER NOTE
Xray of lumbar spine is without fracture. Some age related degenerative changes. Some scoliosis, recommend supportive care.

## 2025-07-15 ENCOUNTER — TELEPHONE (OUTPATIENT)
Dept: PRIMARY CARE | Facility: CLINIC | Age: 80
End: 2025-07-15
Payer: MEDICARE

## 2025-07-15 DIAGNOSIS — M85.80 OSTEOPENIA AFTER MENOPAUSE: ICD-10-CM

## 2025-07-15 DIAGNOSIS — Z78.0 OSTEOPENIA AFTER MENOPAUSE: ICD-10-CM

## 2025-07-15 DIAGNOSIS — E78.5 DYSLIPIDEMIA: Primary | ICD-10-CM

## 2025-07-15 DIAGNOSIS — E78.1 HYPERTRIGLYCERIDEMIA: ICD-10-CM

## 2025-07-15 RX ORDER — PSYLLIUM HUSK 0.4 G
1 CAPSULE ORAL 2 TIMES DAILY
Qty: 60 TABLET | Refills: 0 | Status: SHIPPED | OUTPATIENT
Start: 2025-07-15 | End: 2025-08-14

## 2025-07-15 RX ORDER — ROSUVASTATIN CALCIUM 10 MG/1
10 TABLET, COATED ORAL DAILY
Qty: 30 TABLET | Refills: 0 | Status: SHIPPED | OUTPATIENT
Start: 2025-07-15 | End: 2025-08-14

## 2025-07-15 NOTE — TELEPHONE ENCOUNTER
Call to review lab results. Notified normal kidney and liver function. Normal AIC , normal thyroid. CBC essentially normal. Cholesterol panel abnormal. , triglycerides 284. ASCVD score 24% recommend statin initiation. Recommend rosuvastatin 10 mg daily. Cautioned with side effects to monitor. Repeat CMP in one month assess liver enzyme response. Recommend a low-sugar, low-fat, low-cholesterol, high-fiber, heart-healthy diet and exercise. Requests calcium/vitamin D supplement as prescription. Will send as requested for osteopenia on dexa. States understanding of all information and agrees with plan. Will follow up with patient to assess tolerance to statin.

## 2025-08-13 DIAGNOSIS — E78.1 HYPERTRIGLYCERIDEMIA: ICD-10-CM

## 2025-08-13 DIAGNOSIS — E78.5 DYSLIPIDEMIA: Primary | ICD-10-CM

## 2025-08-13 DIAGNOSIS — E78.5 DYSLIPIDEMIA: ICD-10-CM

## 2025-08-13 RX ORDER — ROSUVASTATIN CALCIUM 10 MG/1
10 TABLET, COATED ORAL DAILY
Qty: 30 TABLET | Refills: 0 | Status: SHIPPED | OUTPATIENT
Start: 2025-08-13 | End: 2025-09-13

## 2025-08-15 LAB
ALBUMIN SERPL-MCNC: 4 G/DL (ref 3.6–5.1)
ALP SERPL-CCNC: 66 U/L (ref 37–153)
ALT SERPL-CCNC: 14 U/L (ref 6–29)
ANION GAP SERPL CALCULATED.4IONS-SCNC: 10 MMOL/L (CALC) (ref 7–17)
AST SERPL-CCNC: 20 U/L (ref 10–35)
BILIRUB SERPL-MCNC: 0.5 MG/DL (ref 0.2–1.2)
BUN SERPL-MCNC: 15 MG/DL (ref 7–25)
CALCIUM SERPL-MCNC: 8.8 MG/DL (ref 8.6–10.4)
CHLORIDE SERPL-SCNC: 105 MMOL/L (ref 98–110)
CO2 SERPL-SCNC: 26 MMOL/L (ref 20–32)
CREAT SERPL-MCNC: 0.76 MG/DL (ref 0.6–1)
EGFRCR SERPLBLD CKD-EPI 2021: 80 ML/MIN/1.73M2
GLUCOSE SERPL-MCNC: 116 MG/DL (ref 65–139)
POTASSIUM SERPL-SCNC: 3.7 MMOL/L (ref 3.5–5.3)
PROT SERPL-MCNC: 6.3 G/DL (ref 6.1–8.1)
SODIUM SERPL-SCNC: 141 MMOL/L (ref 135–146)

## 2025-08-28 DIAGNOSIS — C23 MALIGNANT NEOPLASM OF GALLBLADDER (MULTI): Primary | ICD-10-CM

## 2025-08-28 DIAGNOSIS — Z09 ENCOUNTER FOR FOLLOW-UP EXAMINATION AFTER COMPLETED TREATMENT FOR CONDITIONS OTHER THAN MALIGNANT NEOPLASM: ICD-10-CM

## 2025-10-02 ENCOUNTER — APPOINTMENT (OUTPATIENT)
Dept: HEMATOLOGY/ONCOLOGY | Facility: CLINIC | Age: 80
End: 2025-10-02
Payer: MEDICARE